# Patient Record
Sex: FEMALE | URBAN - METROPOLITAN AREA
[De-identification: names, ages, dates, MRNs, and addresses within clinical notes are randomized per-mention and may not be internally consistent; named-entity substitution may affect disease eponyms.]

---

## 2023-06-13 ENCOUNTER — WEB ENCOUNTER (OUTPATIENT)
Dept: URBAN - METROPOLITAN AREA CLINIC 80 | Facility: CLINIC | Age: 53
End: 2023-06-13

## 2023-06-14 ENCOUNTER — LAB OUTSIDE AN ENCOUNTER (OUTPATIENT)
Dept: URBAN - METROPOLITAN AREA CLINIC 80 | Facility: CLINIC | Age: 53
End: 2023-06-14

## 2023-06-14 ENCOUNTER — OFFICE VISIT (OUTPATIENT)
Dept: URBAN - METROPOLITAN AREA CLINIC 80 | Facility: CLINIC | Age: 53
End: 2023-06-14
Payer: COMMERCIAL

## 2023-06-14 ENCOUNTER — DASHBOARD ENCOUNTERS (OUTPATIENT)
Age: 53
End: 2023-06-14

## 2023-06-14 VITALS
TEMPERATURE: 97.9 F | HEART RATE: 72 BPM | WEIGHT: 226 LBS | SYSTOLIC BLOOD PRESSURE: 147 MMHG | DIASTOLIC BLOOD PRESSURE: 93 MMHG | BODY MASS INDEX: 30.65 KG/M2

## 2023-06-14 DIAGNOSIS — R19.4 CHANGE IN BOWEL HABITS: ICD-10-CM

## 2023-06-14 DIAGNOSIS — Z12.11 COLON CANCER SCREENING: ICD-10-CM

## 2023-06-14 PROCEDURE — 99203 OFFICE O/P NEW LOW 30 MIN: CPT | Performed by: INTERNAL MEDICINE

## 2023-06-14 NOTE — HPI-TODAY'S VISIT:
She comes in today to discuss several months of changes in her bowel habits.  She can note changes in stool color and consistency. She will often note a mucous type discharge on her stool.  She is concerned she could have SIBO or a parasite.  She has been told her thyroid levels are off. She has recently started peppermint oil which has seemed to help.  She has never had a colonoscopy.  Her mother did have pancreatic cancer but she has done very well.

## 2023-06-19 LAB
A/G RATIO: 1.7
ABSOLUTE BASOPHILS: 29
ABSOLUTE EOSINOPHILS: 88
ABSOLUTE LYMPHOCYTES: 1632
ABSOLUTE MONOCYTES: 372
ABSOLUTE NEUTROPHILS: 2778
ALBUMIN: 4.6
ALKALINE PHOSPHATASE: 96
ALT (SGPT): 18
AST (SGOT): 19
BASOPHILS: 0.6
BILIRUBIN, TOTAL: 0.5
BUN/CREATININE RATIO: (no result)
BUN: 10
CALCIUM: 9.7
CARBON DIOXIDE, TOTAL: 26
CHLORIDE: 106
CREATININE: 0.74
EGFR: 96
EOSINOPHILS: 1.8
GLOBULIN, TOTAL: 2.7
GLUCOSE: 90
HEMATOCRIT: 37.2
HEMOGLOBIN: 12.5
LYMPHOCYTES: 33.3
MCH: 29.5
MCHC: 33.6
MCV: 87.7
MONOCYTES: 7.6
MPV: 10.7
NEUTROPHILS: 56.7
PLATELET COUNT: 260
POTASSIUM: 4.2
PROTEIN, TOTAL: 7.3
RDW: 13.7
RED BLOOD CELL COUNT: 4.24
SODIUM: 141
TSH W/REFLEX TO FT4: 0.7
WHITE BLOOD CELL COUNT: 4.9

## 2023-08-04 ENCOUNTER — OFFICE VISIT (OUTPATIENT)
Dept: URBAN - METROPOLITAN AREA SURGERY CENTER 19 | Facility: SURGERY CENTER | Age: 53
End: 2023-08-04
Payer: COMMERCIAL

## 2023-08-04 ENCOUNTER — CLAIMS CREATED FROM THE CLAIM WINDOW (OUTPATIENT)
Dept: URBAN - METROPOLITAN AREA CLINIC 4 | Facility: CLINIC | Age: 53
End: 2023-08-04
Payer: COMMERCIAL

## 2023-08-04 DIAGNOSIS — K63.5 BENIGN COLON POLYP: ICD-10-CM

## 2023-08-04 DIAGNOSIS — K63.89 OTHER SPECIFIED DISEASES OF INTESTINE: ICD-10-CM

## 2023-08-04 DIAGNOSIS — Z12.11 COLON CANCER SCREENING: ICD-10-CM

## 2023-08-04 PROCEDURE — G8907 PT DOC NO EVENTS ON DISCHARG: HCPCS | Performed by: INTERNAL MEDICINE

## 2023-08-04 PROCEDURE — 88305 TISSUE EXAM BY PATHOLOGIST: CPT | Performed by: PATHOLOGY

## 2023-08-04 PROCEDURE — 45380 COLONOSCOPY AND BIOPSY: CPT | Performed by: INTERNAL MEDICINE
